# Patient Record
Sex: MALE | NOT HISPANIC OR LATINO | URBAN - METROPOLITAN AREA
[De-identification: names, ages, dates, MRNs, and addresses within clinical notes are randomized per-mention and may not be internally consistent; named-entity substitution may affect disease eponyms.]

---

## 2020-06-04 ENCOUNTER — IMPORTED ENCOUNTER (OUTPATIENT)
Dept: URBAN - METROPOLITAN AREA CLINIC 1 | Facility: CLINIC | Age: 75
End: 2020-06-04

## 2020-06-04 PROBLEM — D31.31: Noted: 2020-06-04

## 2020-06-04 PROBLEM — H40.013: Noted: 2020-06-04

## 2020-06-04 PROBLEM — H25.813: Noted: 2020-06-04

## 2020-06-04 PROBLEM — H43.812: Noted: 2020-06-04

## 2020-06-04 PROBLEM — H35.373: Noted: 2020-06-04

## 2020-06-04 PROCEDURE — 92250 FUNDUS PHOTOGRAPHY W/I&R: CPT

## 2020-06-04 PROCEDURE — 92004 COMPRE OPH EXAM NEW PT 1/>: CPT

## 2020-06-04 PROCEDURE — 92015 DETERMINE REFRACTIVE STATE: CPT

## 2020-06-04 NOTE — PATIENT DISCUSSION
1.  Glaucoma Suspect OU (CD 0.80 OU): Negative family hx. Baseline photos done today showing disc cupping OU. Patient is considered Low Risk. Condition was discussed with patient and patient understands. Will continue to monitor patient for any progression in condition. Patient was advised to call us with any problems questions or concerns. 2.  Cataract OU: Observe for now without intervention. The patient was advised to contact us if any change or worsening of vision3. Choroidal Nevus OD: Appears Benign and stable. Observe for changes. 4. Epiretinal Membrane OU - Observe for change. 5. PVD w/o Tear OS- RD precautions. MRX for glasses given. Return for an appointment in 1 year 30/OCT with Dr. Tej Wells.

## 2020-06-15 NOTE — PATIENT DISCUSSION
Plan: Advanced ([employer]) OD RAMESH first then TMF OS RAMESH. Pt will consider CV mono OD RAMESH  OS -2.00 goal for near.

## 2020-06-25 NOTE — PATIENT DISCUSSION
Retinal tear and detachment warning symptoms reviewed and patient instructed to call immediately if increasing floaters, flashes, or decreasing peripheral vision. Duration Of Freeze Thaw-Cycle (Seconds): 0 Post-Care Instructions: I reviewed with the patient in detail post-care instructions. Patient is to wear sunprotection, and avoid picking at any of the treated lesions. Pt may apply Vaseline to crusted or scabbing areas. Render Post-Care Instructions In Note?: no Consent: The patient's consent was obtained including but not limited to risks of crusting, scabbing, blistering, scarring, darker or lighter pigmentary change, recurrence, incomplete removal and infection. Detail Level: Detailed

## 2020-06-29 NOTE — PATIENT DISCUSSION
Cataract surgery has been performed in the first eye and activities of daily living are still impaired. The patient would like to proceed with cataract surgery in the second eye as scheduled. The patient elects TMF OS, goal of rudy.

## 2021-06-28 NOTE — PROCEDURE NOTE: CLINICAL
PROCEDURE NOTE: Punctal Plugs, Silicone #1 Left Lower Lid. Anesthesia: Topical. Prep: Antibiotic Drops q 5min x 3. Prior to treatment, the risks/benefits/alternatives were discussed. The patient wished to proceed with procedure. One drop of proparacaine was placed and a drop of lidocaine gel was placed over the puncta. 0.* mm permanent silicone plugs were inserted in 0.6 RLL eyelids. Patient tolerated procedure well. There were no complications. Post procedure instructions given. Bibi Schmitz PROCEDURE NOTE: Punctal Plugs, Silicone #1 Right Lower Lid. Anesthesia: Topical. Prep: Antibiotic Drops q 5min x 3. Prior to treatment, the risks/benefits/alternatives were discussed. The patient wished to proceed with procedure. One drop of proparacaine was placed and a drop of lidocaine gel was placed over the puncta. 0.6 mm permanent silicone plugs were inserted in RLL eyelids. Patient tolerated procedure well. There were no complications. Post procedure instructions given. Bibi Schmitz

## 2022-04-02 ASSESSMENT — VISUAL ACUITY
OD_SC: 20/20
OS_CC: 20/25
OS_CC: J1+
OD_CC: 20/50-1
OS_SC: 20/20
OD_CC: J1+

## 2022-04-02 ASSESSMENT — TONOMETRY
OS_IOP_MMHG: 13
OD_IOP_MMHG: 13

## 2023-01-11 NOTE — PATIENT DISCUSSION
Fuch’s Corneal Dystrophy is present though its effect on the patient’s vision is uncertain. acute w/d symptoms